# Patient Record
Sex: FEMALE | Race: WHITE | NOT HISPANIC OR LATINO | Employment: UNEMPLOYED | URBAN - METROPOLITAN AREA
[De-identification: names, ages, dates, MRNs, and addresses within clinical notes are randomized per-mention and may not be internally consistent; named-entity substitution may affect disease eponyms.]

---

## 2021-11-22 ENCOUNTER — DOCUMENTATION ONLY (OUTPATIENT)
Dept: PREADMISSION TESTING | Facility: HOSPITAL | Age: 31
End: 2021-11-22
Payer: OTHER GOVERNMENT

## 2021-11-22 RX ORDER — DEXTROAMPHETAMINE SACCHARATE, AMPHETAMINE ASPARTATE MONOHYDRATE, DEXTROAMPHETAMINE SULFATE AND AMPHETAMINE SULFATE 7.5; 7.5; 7.5; 7.5 MG/1; MG/1; MG/1; MG/1
30 CAPSULE, EXTENDED RELEASE ORAL EVERY MORNING
COMMUNITY

## 2021-11-22 RX ORDER — IBUPROFEN 800 MG/1
800 TABLET ORAL 3 TIMES DAILY
COMMUNITY

## 2022-01-18 ENCOUNTER — TELEPHONE (OUTPATIENT)
Dept: DERMATOLOGY | Facility: CLINIC | Age: 32
End: 2022-01-18
Payer: OTHER GOVERNMENT

## 2022-01-18 NOTE — TELEPHONE ENCOUNTER
"Pt wanted tummy tuck and lasik eye surgery, inform pt that we do not do those in derm. Offered plastic number , pt hung up in my face    ----- Message from Mariusz Burnette sent at 1/18/2022  9:04 AM CST -----  This patient reached out to Clinic  to confirm [facility]. The patient also stated they needed "skin removal" on another part of their body. Please let me know if the patient is able to see Dermatology for this.      "

## 2023-04-28 ENCOUNTER — HOSPITAL ENCOUNTER (EMERGENCY)
Facility: HOSPITAL | Age: 33
Discharge: HOME | End: 2023-04-28
Attending: STUDENT IN AN ORGANIZED HEALTH CARE EDUCATION/TRAINING PROGRAM
Payer: OTHER GOVERNMENT

## 2023-04-28 VITALS
HEART RATE: 60 BPM | BODY MASS INDEX: 33.74 KG/M2 | SYSTOLIC BLOOD PRESSURE: 133 MMHG | WEIGHT: 215 LBS | OXYGEN SATURATION: 99 % | TEMPERATURE: 98 F | DIASTOLIC BLOOD PRESSURE: 81 MMHG | HEIGHT: 67 IN | RESPIRATION RATE: 18 BRPM

## 2023-04-28 DIAGNOSIS — M62.838 TRAPEZIUS MUSCLE SPASM: ICD-10-CM

## 2023-04-28 DIAGNOSIS — M54.12 CERVICAL RADICULOPATHY: Primary | ICD-10-CM

## 2023-04-28 PROBLEM — E28.2 PCOS (POLYCYSTIC OVARIAN SYNDROME): Status: ACTIVE | Noted: 2019-11-11

## 2023-04-28 PROBLEM — F32.A ANXIETY AND DEPRESSION: Status: ACTIVE | Noted: 2019-11-11

## 2023-04-28 PROBLEM — F41.9 ANXIETY AND DEPRESSION: Status: ACTIVE | Noted: 2019-11-11

## 2023-04-28 LAB
B-HCG UR QL: NEGATIVE
CTP QC/QA: YES

## 2023-04-28 PROCEDURE — 99285 EMERGENCY DEPT VISIT HI MDM: CPT | Mod: 25

## 2023-04-28 PROCEDURE — 63600175 PHARM REV CODE 636 W HCPCS: Performed by: PHYSICIAN ASSISTANT

## 2023-04-28 PROCEDURE — 96372 THER/PROPH/DIAG INJ SC/IM: CPT | Performed by: PHYSICIAN ASSISTANT

## 2023-04-28 PROCEDURE — 25000003 PHARM REV CODE 250: Performed by: PHYSICIAN ASSISTANT

## 2023-04-28 PROCEDURE — 81025 URINE PREGNANCY TEST: CPT | Performed by: PHYSICIAN ASSISTANT

## 2023-04-28 RX ORDER — VILAZODONE HYDROCHLORIDE 20 MG/1
TABLET ORAL
COMMUNITY
Start: 2022-05-12

## 2023-04-28 RX ORDER — ORPHENADRINE CITRATE 100 MG/1
100 TABLET, EXTENDED RELEASE ORAL 2 TIMES DAILY
Qty: 8 TABLET | Refills: 0 | Status: SHIPPED | OUTPATIENT
Start: 2023-04-28 | End: 2023-05-02

## 2023-04-28 RX ORDER — KETOROLAC TROMETHAMINE 30 MG/ML
30 INJECTION, SOLUTION INTRAMUSCULAR; INTRAVENOUS
Status: COMPLETED | OUTPATIENT
Start: 2023-04-28 | End: 2023-04-28

## 2023-04-28 RX ORDER — ORPHENADRINE CITRATE 100 MG/1
100 TABLET, EXTENDED RELEASE ORAL ONCE
Status: COMPLETED | OUTPATIENT
Start: 2023-04-28 | End: 2023-04-28

## 2023-04-28 RX ORDER — MELOXICAM 7.5 MG/1
7.5 TABLET ORAL DAILY
Qty: 12 TABLET | Refills: 0 | Status: SHIPPED | OUTPATIENT
Start: 2023-04-28

## 2023-04-28 RX ADMIN — KETOROLAC TROMETHAMINE 30 MG: 30 INJECTION, SOLUTION INTRAMUSCULAR; INTRAVENOUS at 04:04

## 2023-04-28 RX ADMIN — ORPHENADRINE CITRATE 100 MG: 100 TABLET, EXTENDED RELEASE ORAL at 04:04

## 2023-04-28 NOTE — ED PROVIDER NOTES
Encounter Date: 4/28/2023       History     Chief Complaint   Patient presents with    Numbness     Pt to ED with complaints of tingling in R arm. Denies neck/shoulder problems. No blurred vision, no slurred speech, headache, dizziness, weakness. Reports lifting heavy sheet rock yesterday. MD at side, NO STROKE ALERT.      32-year-old female with no pertinent past medical history presents to the emergency department for paresthesia to the right upper extremity associated with mild right-sided neck pain that she noticed this morning and worsened around 2:00 p.m. today.  States yesterday she was lifting heavy sheet rock.  Denies prior neck injuries, visual disturbance, headache, chest pain, and shortness of breath.  Denies weakness.  Ibuprofen earlier today without relief.    The history is provided by the patient.   Review of patient's allergies indicates:  No Known Allergies  History reviewed. No pertinent past medical history.  Past Surgical History:   Procedure Laterality Date    Gastric sleeve  10/2020     Family History   Problem Relation Age of Onset    Aneurysm Mother     Stroke Mother     Obesity Father     Hypertension Father     Hyperlipidemia Father      Social History     Tobacco Use    Smoking status: Former   Substance Use Topics    Alcohol use: Never    Drug use: Never     Review of Systems   Constitutional:  Negative for fever.   Eyes:  Negative for photophobia and visual disturbance.   Respiratory:  Negative for shortness of breath.    Cardiovascular:  Negative for chest pain.   Gastrointestinal:  Negative for abdominal pain, nausea and vomiting.   Musculoskeletal:  Positive for neck pain. Negative for back pain and joint swelling.   Skin:  Negative for color change and wound.   Neurological:  Positive for numbness. Negative for dizziness, syncope and headaches.   All other systems reviewed and are negative.    Physical Exam     Initial Vitals [04/28/23 1615]   BP Pulse Resp Temp SpO2   129/88 82 17  98 °F (36.7 °C) 95 %      MAP       --         Physical Exam    Nursing note and vitals reviewed.  Constitutional: She appears well-developed and well-nourished. She is not diaphoretic. No distress.   HENT:   Head: Atraumatic.   Right Ear: External ear normal.   Left Ear: External ear normal.   Eyes: Conjunctivae and EOM are normal.   Neck: No tracheal deviation present.   Normal range of motion.  Cardiovascular:  Normal rate and regular rhythm.           Pulmonary/Chest: No accessory muscle usage or stridor. No tachypnea. No respiratory distress.   Musculoskeletal:         General: No edema. Normal range of motion.      Cervical back: Normal range of motion.      Comments: Tenderness to palpation of the right trapezius muscle.  Positive axial loading test.  Equal strength to upper extremities.  Full cervical ROM.  No C-spine tenderness or bony deformities.  No overlying skin changes.  Radial pulses 2+ and equal.     Neurological: She is alert and oriented to person, place, and time. She has normal strength. She displays no tremor. She displays no seizure activity. Coordination and gait normal.   Skin: Skin is intact. Capillary refill takes less than 2 seconds. No rash noted. No erythema.       ED Course   Procedures  Labs Reviewed   POCT URINE PREGNANCY          Imaging Results              CT Cervical Spine Without Contrast (Final result)  Result time 04/28/23 17:37:27      Final result by Mercedez Luna MD (04/28/23 17:37:27)                   Impression:      No acute cervical abnormality detected.      Electronically signed by: Mercedez Luna  Date:    04/28/2023  Time:    17:37               Narrative:    EXAMINATION:  CT OF THE CERVICAL  SPINE WITHOUT    CLINICAL HISTORY:  Cervical radiculopathy, no red flags;    TECHNIQUE:  2.5 mm axial images were obtained through the cervical  spine. Coronal and sagittal reformatted images were provided.    COMPARISON:  None.    FINDINGS:  There is minimal reversal  of the normal cervical lordosis.  There is no prevertebral soft tissue swelling.  There is no vertebral body fracture or subluxation.                                       Medications   orphenadrine 12 hr tablet 100 mg (100 mg Oral Given 4/28/23 1633)   ketorolac injection 30 mg (30 mg Intramuscular Given 4/28/23 1634)     Medical Decision Making:   History:   Old Medical Records: I decided to obtain old medical records.  ED Management:  Presentation consistent with cervical radiculopathy.  Screening CT of neck shows no significant bony instability.  She does have evidence of spasming neck and trapezius muscles which is likely causing most of her symptoms.  Symptoms do improve in the ED with muscle relaxer and NSAID.  No deficits.  Are consistent with stroke or carotid dissection.  Low risk for ACS.  May benefit from Neurosurgery follow-up as an outpatient if symptoms persist/reoccur.                        Clinical Impression:   Final diagnoses:  [M54.12] Cervical radiculopathy (Primary)  [M62.838] Trapezius muscle spasm        ED Disposition Condition    Discharge Stable          ED Prescriptions       Medication Sig Dispense Start Date End Date Auth. Provider    meloxicam (MOBIC) 7.5 MG tablet Take 1 tablet (7.5 mg total) by mouth once daily. 12 tablet 4/28/2023 -- Ziyad Olguin PA-C    orphenadrine (NORFLEX) 100 mg tablet Take 1 tablet (100 mg total) by mouth 2 (two) times daily. for 4 days 8 tablet 4/28/2023 5/2/2023 Ziyad Olguin PA-C          Follow-up Information       Follow up With Specialties Details Why Contact Info    [facility] [place]  Schedule an appointment as soon as possible for a visit in 1 day For reevaluation, AND to establish primary if you don't have a  [address] [place] [address]  [phone]      [facility] NEUROSURGERY Neurosurgery Schedule an appointment as soon as possible for a visit in 1 day For further evaluation, For more definitive management of your symptoms  [address] [facility] [address]  [phone]    [facility] Emergency Dept Emergency Medicine Go to  If symptoms worsen [address] [facility] [id number] [phone]             Ziyad Olguin PA-C  04/28/23 1815

## 2023-04-28 NOTE — DISCHARGE INSTRUCTIONS
Thank you for coming to our Emergency Department today. It is important to remember that some problems or medical conditions are difficult to diagnose and may not be found or addressed during your Emergency Department visit.  These conditions often start with non-specific symptoms and can only be diagnosed on follow up visits with your primary care physician or specialist when the symptoms continue or change. Please remember that all medical conditions can change, and we cannot predict how you will be feeling tomorrow or the next day. Return to the ER with any questions/concerns, new/concerning symptoms, worsening or failure to improve.       Be sure to follow up with your primary care doctor and review all labs/imaging/tests that were performed during your ER visit with them. It is very common for us to identify non-emergent incidental findings which must be followed up with your primary care physician.  Some labs/imaging/tests may be outside of the normal range, and require non-emergent follow-up and/or further investigation/treatment/procedures/testing to help diagnose/exclude/prevent complications or other potentially serious medical conditions. Some abnormalities may not have been discussed or addressed during your ER visit.     An ER visit does not replace a primary care visit, and many screening tests or follow-up tests cannot be ordered by an ER doctor or performed by the ER. Some tests may even require pre-approval.    If you do not have a primary care doctor, you may contact the one listed on your discharge paperwork or you may also call the [facility] Appointment Desk at [phone] , or [address] [place] [address] at  [phone] to schedule an appointment, or establish care with a primary care doctor or even a specialist and to obtain information about local resources. It is important to your health that you have a primary care doctor.    Please take all medications as directed. We have done our best to select  a medication for you that will treat your condition however, all medications may potentially have side-effects and it is impossible to predict which medications may give you side-effects or what those side-effects (if any) those medications may give you.  If you feel that you are having a negative effect or side-effect of any medication you should stop taking those medications immediately and seek medical attention. If you feel that you are having a life-threatening reaction call 911.        Do not drive, swim, climb to height, take a bath, operate heavy machinery, drink alcohol or take potentially sedating medications, sign any legal documents or make any important decisions for 24 hours if you have received any pain medications, sedatives or mood altering drugs during your ER visit or within 24 hours of taking them if they have been prescribed to you.     You can find additional resources for Dentists, hearing aids, durable medical equipment, low cost pharmacies and other resources at [email or web address]

## 2023-06-28 ENCOUNTER — TELEPHONE (OUTPATIENT)
Dept: NEUROSURGERY | Facility: CLINIC | Age: 33
End: 2023-06-28
Payer: OTHER GOVERNMENT

## 2023-06-28 NOTE — TELEPHONE ENCOUNTER
SW patient and confirmed her appointment with Lida scheduled for 07.11.2023 @1733 to review recent CT C per the referral of Clau for neck pain.